# Patient Record
Sex: FEMALE | Race: WHITE | NOT HISPANIC OR LATINO | Employment: FULL TIME | URBAN - METROPOLITAN AREA
[De-identification: names, ages, dates, MRNs, and addresses within clinical notes are randomized per-mention and may not be internally consistent; named-entity substitution may affect disease eponyms.]

---

## 2018-11-20 ENCOUNTER — OFFICE VISIT (OUTPATIENT)
Dept: RETAIL CLINIC | Facility: CLINIC | Age: 38
End: 2018-11-20

## 2018-11-20 VITALS
SYSTOLIC BLOOD PRESSURE: 112 MMHG | OXYGEN SATURATION: 99 % | HEART RATE: 68 BPM | TEMPERATURE: 98 F | RESPIRATION RATE: 18 BRPM | DIASTOLIC BLOOD PRESSURE: 66 MMHG

## 2018-11-20 DIAGNOSIS — J06.9 ACUTE URI: Primary | ICD-10-CM

## 2018-11-20 DIAGNOSIS — J01.40 ACUTE NON-RECURRENT PANSINUSITIS: ICD-10-CM

## 2018-11-20 PROCEDURE — 99213 OFFICE O/P EST LOW 20 MIN: CPT | Performed by: NURSE PRACTITIONER

## 2018-11-20 RX ORDER — FLUCONAZOLE 150 MG/1
150 TABLET ORAL ONCE
Qty: 1 TABLET | Refills: 0 | Status: SHIPPED | OUTPATIENT
Start: 2018-11-20 | End: 2018-11-20

## 2018-11-20 RX ORDER — AMOXICILLIN AND CLAVULANATE POTASSIUM 875; 125 MG/1; MG/1
1 TABLET, FILM COATED ORAL 2 TIMES DAILY
Qty: 20 TABLET | Refills: 0 | Status: SHIPPED | OUTPATIENT
Start: 2018-11-20 | End: 2018-11-30

## 2018-11-20 NOTE — PROGRESS NOTES
"Ne Ward is a 38 y.o. female.     URI    This is a new problem. The current episode started in the past 7 days. The problem has been gradually worsening. There has been no fever. Associated symptoms include congestion, coughing, headaches, joint pain (\"achiness\"), a plugged ear sensation, rhinorrhea, sinus pain and a sore throat. Pertinent negatives include no nausea or vomiting. Treatments tried: allegra, dayquil, nyquil. The treatment provided no relief.        The following portions of the patient's history were reviewed and updated as appropriate: allergies, current medications, past family history, past medical history, past social history, past surgical history and problem list.    Review of Systems   Constitutional: Positive for fatigue.   HENT: Positive for congestion, rhinorrhea and sore throat.    Respiratory: Positive for cough.    Gastrointestinal: Negative for nausea and vomiting.   Genitourinary: Negative.    Musculoskeletal: Positive for joint pain (\"achiness\").   Neurological: Positive for headache.       Objective   Physical Exam   Constitutional: She is cooperative. No distress.   HENT:   Head: Normocephalic.   Right Ear: Hearing, external ear and ear canal normal. A middle ear effusion is present.   Left Ear: Hearing, external ear and ear canal normal. A middle ear effusion is present.   Nose: Right sinus exhibits maxillary sinus tenderness and frontal sinus tenderness. Left sinus exhibits maxillary sinus tenderness and frontal sinus tenderness.   Mouth/Throat: Posterior oropharyngeal erythema present.   Eyes: Conjunctivae, EOM and lids are normal. Pupils are equal, round, and reactive to light.   Neck: Trachea normal and full passive range of motion without pain.   Cardiovascular: Normal rate, regular rhythm and normal pulses.   Pulmonary/Chest: Effort normal and breath sounds normal.   Lymphadenopathy:     She has cervical adenopathy.        Right cervical: Superficial " cervical adenopathy present.        Left cervical: Superficial cervical adenopathy present.   Neurological: She is alert.   Skin: Skin is warm. Capillary refill takes less than 2 seconds.   Psychiatric: She has a normal mood and affect. Her speech is normal and behavior is normal.   Vitals reviewed.        Assessment/Plan   Vanessa was seen today for uri.    Diagnoses and all orders for this visit:    Acute URI    Acute non-recurrent pansinusitis    Other orders  -     amoxicillin-clavulanate (AUGMENTIN) 875-125 MG per tablet; Take 1 tablet by mouth 2 (Two) Times a Day for 10 days.  -     fluconazole (DIFLUCAN) 150 MG tablet; Take 1 tablet by mouth 1 (One) Time for 1 dose.

## 2020-07-21 ENCOUNTER — TRANSCRIBE ORDERS (OUTPATIENT)
Dept: PHYSICAL THERAPY | Facility: CLINIC | Age: 40
End: 2020-07-21

## 2020-07-21 DIAGNOSIS — M51.36 OTHER INTERVERTEBRAL DISC DEGENERATION, LUMBAR REGION: Primary | ICD-10-CM

## 2020-07-27 ENCOUNTER — TREATMENT (OUTPATIENT)
Dept: PHYSICAL THERAPY | Facility: CLINIC | Age: 40
End: 2020-07-27

## 2020-07-27 DIAGNOSIS — M51.36 OTHER INTERVERTEBRAL DISC DEGENERATION, LUMBAR REGION: ICD-10-CM

## 2020-07-27 DIAGNOSIS — G89.4 CHRONIC PAIN DISORDER: ICD-10-CM

## 2020-07-27 DIAGNOSIS — G89.29 CHRONIC BILATERAL LOW BACK PAIN WITHOUT SCIATICA: Primary | ICD-10-CM

## 2020-07-27 DIAGNOSIS — M54.50 CHRONIC BILATERAL LOW BACK PAIN WITHOUT SCIATICA: Primary | ICD-10-CM

## 2020-07-27 DIAGNOSIS — M79.7 FIBROMYALGIA: ICD-10-CM

## 2020-07-27 PROCEDURE — 97162 PT EVAL MOD COMPLEX 30 MIN: CPT | Performed by: PHYSICAL THERAPIST

## 2020-07-27 NOTE — PROGRESS NOTES
Physical Therapy Initial Evaluation and Plan of Care      Patient: Vanessa Ward   : 1980  Diagnosis/ICD-10 Code:  Chronic bilateral low back pain without sciatica [M54.5, G89.29]  Referring practitioner: Derrick Duque MD  Date of Initial Visit: 2020  Today's Date: 2020  Patient seen for 1 sessions           Subjective Evaluation    History of Present Illness  Onset date: 1 year.  Mechanism of injury: Patient has pain due to fibromyalgia and Sjogren's syndrome for about 1 year.  Aquatic therapy was recommended by her pain management doctor.  She has done traditional physical therapy but was not helpful with exercise and traction.  Primary areas that bother her are her pain, tailbone, hips, knees and shoulders.  Pain increases with activity and sitting is difficult, she has intense pain in her tailbone when she transitions from sit to standing that is worse the longer she sits.  She cannot get rid of stiff feeling.  She has tried walking but that has not helping. She takes gabapentin, cymbalta, plaquanil, azathioprine, restasis.       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  Current pain ratin  At best pain ratin  At worst pain rating: 10  Location: Multiple areas  Relieving factors: rest  Aggravating factors: prolonged positioning  Progression: worsening    Diagnostic Tests  MRI studies: abnormal (Slight bulge)    Treatments  Previous treatment: physical therapy, medication and chiropractic (diet)  Patient Goals  Patient goals for therapy: decreased pain             Objective          Static Posture     Comments  Upper thoracic hump starting  Mild decrease lumbar lordosis   Swelling over R dorsal wrist     Tenderness     Left Hip   Tenderness in the PSIS, greater trochanter and sacroiliac joint.     Right Hip   Tenderness in the PSIS, greater trochanter, iliac crest and sacroiliac joint.     Additional Tenderness Details  Tender to  tailbone    Active Range of Motion     Lumbar   Flexion: WFL  Extension: Active lumbar extension: 2/3. with pain  Left lateral flexion: Active left lumbar lateral flexion: 2/3 stiff.   Right lateral flexion: Active right lumbar lateral flexion: 2/3 stiff.   Left rotation: Active left lumbar rotation: 2/3 stiff.   Right rotation: Active right lumbar rotation: 2/3 stiff.     Additional Active Range of Motion Details  Flexibility:  R more than L side hamstrings, hip rotators, quadriceps with more pain on the R     Strength/Myotome Testing     Left Shoulder     Planes of Motion   Flexion: 4     Right Shoulder     Planes of Motion   Flexion: 4     Left Elbow   Flexion: 4+  Extension: 4    Right Elbow   Flexion: 4+  Extension: 4    Left Hip   Planes of Motion   Flexion: 4-    Right Hip   Planes of Motion   Flexion: 4-    Left Knee   Flexion: 4+  Extension: 4+    Right Knee   Flexion: 4+  Extension: 4+    Left Ankle/Foot   Dorsiflexion: 4+  Plantar flexion: 4    Right Ankle/Foot   Dorsiflexion: 4+  Plantar flexion: 4    Additional Strength Details  Transverse abdominals 3+/5  Able to bridge hips    Functional Assessment     Single Leg Stance   Left: 30 seconds  Right: 12 (Increased unsteadiness) seconds        See Exercise, Manual, and Modality Logs for complete treatment.   Functional outcome score: Modified Oswestry 32% disability              Assessment & Plan     Assessment  Impairments: abnormal muscle firing, abnormal muscle tone, abnormal or restricted ROM, activity intolerance, impaired balance, impaired physical strength, lacks appropriate home exercise program and pain with function  Assessment details: Vanessa Ward is a 40 y.o. year-old female referred to physical therapy for chronic back pain due to disc problem. She presents with a evolving clinical presentation of worsening symptoms.  She has comorbidities of fibromyalgia, Sjogren's syndrome, chronic fatigue and personal factors of brain fog and full-time  sitting occupation that may affect her progress in the plan of care.  Signs and symptoms are consistent with physical therapy diagnosis of impaired mobility primarily affecting sit to stand transfer due to chronic pain.   Prognosis: good  Functional Limitations: uncomfortable because of pain, standing and unable to perform repetitive tasks  Goals  Plan Goals: Date to achieve goals:  10/25/2020    Date to achieve STGs:  09/07/2020    STG 1 Patient will perform aquatic therapy exercises for lumbar  ROM/flexibility, strength, decompression and conditioning without increased pain.    STG 2 Patient will demonstrate good postural awareness with standing and walking unsupported in pool.    STG 3 Patient will report decreased peak pain following aquatic therapy session from 10/10 to 7/10 for at least 12 hours     Date to achieve LTGs:  10/25/2020    LTG 1 Patient will demonstrate an independent aquatic HEP for lumbar  strength,  ROM/flexibility, decompression, conditioning and balance with community resources     LTG 2 Patient will demonstrate increased core strength and balance to stand on single leg for 30 seconds stable     LTG 3 Patient will report decreased functional disability on Modified Oswestry score from 32 % to 22 %      Plan  Therapy options: will be seen for skilled physical therapy services  Other planned modality interventions: Aquatic therapy  Planned therapy interventions: abdominal trunk stabilization, balance/weight-bearing training, flexibility, functional ROM exercises, strengthening, neuromuscular re-education, gait training, home exercise program, therapeutic activities, stretching, transfer training and postural training  Duration in visits: 20  Duration in weeks: 12  Treatment plan discussed with: patient  Plan details: Lumbar decompression, stretching, AROM        Timed:  Manual Therapy:    0     mins  36113;  Therapeutic Exercise:    0     mins  90639;     Neuromuscular Celestino:    0    mins  94559;     Therapeutic Activity:     0     mins  29118;     Gait Trainin     mins  46259;     Ultrasound:     0     mins  06776;    Electrical Stimulation:    0     mins  81866 ( );  Iontophoresis    0     mins 07740  Dry Needling   0     mins      Untimed:  Electrical Stimulation:    0     mins  53547 ( );  Mechanical Traction:    0     mins  30634;     Timed Treatment:   0   mins   Total Treatment:     30   mins    PT SIGNATURE: Herminia Killian, PT   DATE TREATMENT INITIATED: 2020    Initial Certification  Certification Period: 10/25/2020  I certify that the therapy services are furnished while this patient is under my care.  The services outlined above are required by this patient, and will be reviewed every 90 days.     PHYSICIAN: Derrick Duque MD      DATE:     Please sign and return via fax to [phone].. Thank you, [facility].

## 2020-08-07 ENCOUNTER — TREATMENT (OUTPATIENT)
Dept: PHYSICAL THERAPY | Facility: CLINIC | Age: 40
End: 2020-08-07

## 2020-08-07 DIAGNOSIS — G89.4 CHRONIC PAIN DISORDER: ICD-10-CM

## 2020-08-07 DIAGNOSIS — M51.36 OTHER INTERVERTEBRAL DISC DEGENERATION, LUMBAR REGION: ICD-10-CM

## 2020-08-07 DIAGNOSIS — G89.29 CHRONIC BILATERAL LOW BACK PAIN WITHOUT SCIATICA: Primary | ICD-10-CM

## 2020-08-07 DIAGNOSIS — M54.50 CHRONIC BILATERAL LOW BACK PAIN WITHOUT SCIATICA: Primary | ICD-10-CM

## 2020-08-07 DIAGNOSIS — M79.7 FIBROMYALGIA: ICD-10-CM

## 2020-08-07 PROCEDURE — 97113 AQUATIC THERAPY/EXERCISES: CPT | Performed by: PHYSICAL THERAPIST

## 2020-08-07 NOTE — PROGRESS NOTES
Physical Therapy Daily Progress Note    Patient: Vanessa Ward   : 1980  Diagnosis/ICD-10 Code:  Chronic bilateral low back pain without sciatica [M54.5, G89.29]  Referring practitioner: Derrick Duque MD  Date of Initial Visit: Type: THERAPY  Noted: 2020  Today's Date: 2020  Patient seen for 2 sessions             Subjective Pt reports walking the bridge yesterday “real slow” without pain but had to go to bed early due to body fatigue “I can’t go anymore.”    Objective   AQUATICS    Water Walk 25 feet x 2 of each of the following: forward, backwards, sidestep; no UE support  Stretch  1 Standing HS stretch with large noodle at ankle 30 sec x 2 each leg   Stretch 2 Piriformis stretch seated 30 sec x 2   Stretch 3   Stretch 4  Vertical Traction   Abdominals  X 10 large noodle push downs + X 10 trunk rotations R and L with large noodle support   Clams     Hip Abd/Add X 10 each leg with single UE support  Hip Flex/Ext X 10 each leg with single UE support  March in Place   Mini Squat  X 10 with wall support  Toe/Heel Raises X 10 with railing for support  Uni-Squat    Uni-Clock    Step Ups    Bicycle  Seated x 2 min   Flutter/Scissor  Seated x 1 min each  Exercise 1 Leg press x 10 with blue ring and single UE support on rail  Exercise 2 Stand hip sweeps with large noodle under flexed knee x 10 each leg; wall at back support   Exercise 3     Assessment/Plan  Pt continues to be limited by chronic pain in both LE and UE reporting pain with exercises and stretches but able to complete. Pt educated on core activation and contraction with dynamic LE exercises for increased lumbar support/stability. Pt also told to monitor symptoms over next 48 hours following first aquatic session for appropriate exercise prescription along with importance of hydration.        Pt answered yes to headaches on COVID screen; but reports she get headaches frequently- no new symptoms.    Timed:  Aquatic Therapy    48     mins  01179;    Lauren Angel, SHANT  Physical Therapist

## 2020-08-10 ENCOUNTER — TREATMENT (OUTPATIENT)
Dept: PHYSICAL THERAPY | Facility: CLINIC | Age: 40
End: 2020-08-10

## 2020-08-10 DIAGNOSIS — G89.29 CHRONIC BILATERAL LOW BACK PAIN WITHOUT SCIATICA: Primary | ICD-10-CM

## 2020-08-10 DIAGNOSIS — M54.50 CHRONIC BILATERAL LOW BACK PAIN WITHOUT SCIATICA: Primary | ICD-10-CM

## 2020-08-10 DIAGNOSIS — G89.4 CHRONIC PAIN DISORDER: ICD-10-CM

## 2020-08-10 DIAGNOSIS — M51.36 OTHER INTERVERTEBRAL DISC DEGENERATION, LUMBAR REGION: ICD-10-CM

## 2020-08-10 DIAGNOSIS — M79.7 FIBROMYALGIA: ICD-10-CM

## 2020-08-10 PROCEDURE — 97113 AQUATIC THERAPY/EXERCISES: CPT | Performed by: PHYSICAL THERAPIST

## 2020-08-10 NOTE — PROGRESS NOTES
Physical Therapy Daily Progress Note    Patient: Vanessa Ward   : 1980  Diagnosis/ICD-10 Code:  Chronic bilateral low back pain without sciatica [M54.5, G89.29]  Referring practitioner: Derrick Duque MD  Date of Initial Visit: Type: THERAPY  Noted: 2020  Today's Date: 8/10/2020  Patient seen for 3 sessions             Subjective Pt reports soreness for a few days following last aquatic session but rates pain 3/10 this date.    Objective   AQUATICS     Water Walk     25 feet x 4 of each of the following: forward, backwards, sidestep; no UE support  Stretch  1         Standing HS stretch with large noodle at ankle 30 sec x 2 each leg   Stretch 2          Piriformis stretch seated 30 sec x 2     Stretch 3            Stretch 4  Vertical Traction            Abdominals     X 15 large noodle push downs + X 10 trunk rotations R and L with large noodle support             Clams                            Hip Abd/Add    X 12 each leg with single UE support  Hip Flex/Ext     X 12 each leg with single UE support  March in Place            x 10 each leg-alternating patter, no UE support  Mini Squat                   X 12 with wall support  Toe/Heel Raises         X 12 with railing for support  Uni-Squat                      Uni-Clock                      Step Ups                       Bicycle             Seated x 2 min              Flutter/Scissor             Seated x 1 min each  Exercise 1       Leg press x 10 with blue ring and single UE support on rail  Exercise 2       Stand hip sweeps with large noodle under flexed knee x 10 each leg; wall at back support            Exercise 3       Standing hip circles x 10 CW/CCW; rail for balance    Assessment/Plan  Slight increase in LE strengthening exercises this date; no significant changes due to reports of soreness. Addition of MIP and hip circles this chintan.Plan to continue to progress exercises to improve strength and muscle endurance along with activity  tolerance and decreased pain.        Timed:  Aquatic Therapy    45     mins 35604;    Lauren Angel, PT  Physical Therapist

## 2020-08-19 ENCOUNTER — TREATMENT (OUTPATIENT)
Dept: PHYSICAL THERAPY | Facility: CLINIC | Age: 40
End: 2020-08-19

## 2020-08-19 DIAGNOSIS — M51.36 OTHER INTERVERTEBRAL DISC DEGENERATION, LUMBAR REGION: ICD-10-CM

## 2020-08-19 DIAGNOSIS — G89.4 CHRONIC PAIN DISORDER: ICD-10-CM

## 2020-08-19 DIAGNOSIS — M54.50 CHRONIC BILATERAL LOW BACK PAIN WITHOUT SCIATICA: Primary | ICD-10-CM

## 2020-08-19 DIAGNOSIS — M79.7 FIBROMYALGIA: ICD-10-CM

## 2020-08-19 DIAGNOSIS — G89.29 CHRONIC BILATERAL LOW BACK PAIN WITHOUT SCIATICA: Primary | ICD-10-CM

## 2020-08-19 PROCEDURE — 97113 AQUATIC THERAPY/EXERCISES: CPT | Performed by: PHYSICAL THERAPIST

## 2020-08-19 NOTE — PROGRESS NOTES
Physical Therapy Daily Progress Note    Patient: Vanessa Ward   : 1980  Diagnosis/ICD-10 Code:  Chronic bilateral low back pain without sciatica [M54.5, G89.29]  Referring practitioner: Derrick Duque MD  Date of Initial Visit: Type: THERAPY  Noted: 2020  Today's Date: 2020  Patient seen for 4 sessions             Subjective Pt reports 2/10 low back pain at star of session.    Objective   AQUATICS     Water Walk     25 feet x 4 of each of the following: forward, backwards, sidestep; no UE support  Stretch  1         Standing HS stretch with large noodle at ankle 30 sec x 2 each leg   Stretch 2          Piriformis stretch seated 30 sec x 2     Stretch 3          Standing calf stretch 20 sec x 2 each leg  Stretch 4 Standing quad stretch 30 sec x 2 each leg-PT assist due to limited knee ROM  Stretch 5  Wall crawl 20 sec x 3 bouts  Vertical Traction            Abdominals     X 20 large noodle push downs + X 10 trunk rotations R and L with large noodle support             Clams                            Hip Abd/Add    X 12 each leg with single UE support  Hip Flex/Ext     X 12 each leg with single UE support  March in Place            x 10 each leg-alternating patter, no UE support  Mini Squat                   X 15 with wall support  Toe/Heel Raises         X 12 with railing for support  Uni-Squat                      Uni-Clock                      Step Ups                       Bicycle             Seated x 3 min              Flutter/Scissor             Seated x 1 min each  Exercise 1       Leg press x 15 with blue ring and single UE support on rail  Exercise 2       Stand hip sweeps with large noodle under flexed knee x 20 each leg; wall at back support            Exercise 3       Standing hip circles x 10 CW/CCW; rail for balance  Exercise 4 Seated double knees to chest x 10      Assessment/Plan  Pt able to tolerate increased reps of core/LE strengthening exercises this date (push downs, hip  sweeps, mini squats) with complaints of mild joint pain in both knees and hips intermittently. Addition of calf and quad stretch, wall crawl along with double knees to chest for flexibility and core strengthening. Planning to continue to increase reps and exercises per pt’s tolerance for decreased low back pain and improved ease with functional mobility.         Timed:  Aquatic Therapy    58     mins 35149;    Lauren Angel, PT  Physical Therapist

## 2020-08-24 ENCOUNTER — TREATMENT (OUTPATIENT)
Dept: PHYSICAL THERAPY | Facility: CLINIC | Age: 40
End: 2020-08-24

## 2020-08-24 DIAGNOSIS — G89.4 CHRONIC PAIN DISORDER: ICD-10-CM

## 2020-08-24 DIAGNOSIS — M54.50 CHRONIC BILATERAL LOW BACK PAIN WITHOUT SCIATICA: Primary | ICD-10-CM

## 2020-08-24 DIAGNOSIS — M79.7 FIBROMYALGIA: ICD-10-CM

## 2020-08-24 DIAGNOSIS — G89.29 CHRONIC BILATERAL LOW BACK PAIN WITHOUT SCIATICA: Primary | ICD-10-CM

## 2020-08-24 DIAGNOSIS — M51.36 OTHER INTERVERTEBRAL DISC DEGENERATION, LUMBAR REGION: ICD-10-CM

## 2020-08-24 PROCEDURE — 97113 AQUATIC THERAPY/EXERCISES: CPT | Performed by: PHYSICAL THERAPIST

## 2020-08-24 NOTE — PROGRESS NOTES
Physical Therapy Re-certification         Patient: Vanessa Ward   : 1980  Diagnosis/ICD-10 Code:  Chronic bilateral low back pain without sciatica [M54.5, G89.29]  Referring practitioner: Derrick Duque MD  Date of Initial Visit: Type: THERAPY  Noted: 2020  Today's Date: 2020  Patient seen for 5 sessions      Subjective:     Clinical Progress: improved  Home Program Compliance: N/A  Treatment has included:  aquatic therapy    Subjective Patient has pain due to fibromyalgia and Sjogren's syndrome for about 1 year.  Aquatic therapy was recommended by her pain management doctor.  She has done traditional physical therapy but was not helpful with exercise and traction.  Primary areas that bother her are her pain, tailbone, hips, knees and shoulders.  Pain increases with activity and sitting is difficult, she has intense pain in her tailbone when she transitions from sit to standing that is worse the longer she sits.  She cannot get rid of stiff feeling.  She has tried walking but that has not helping.    Objective   Left Shoulder      Planes of Motion   Flexion: 4+    Right Shoulder      Planes of Motion   Flexion: 4+     Left Elbow   Flexion: 4+  Extension: 4     Right Elbow   Flexion: 4+  Extension: 4     Left Hip   Planes of Motion   Flexion: 4     Right Hip   Planes of Motion   Flexion: 4-     Left Knee   Flexion: 4+  Extension: 4+     Right Knee   Flexion: 4+  Extension: 4+     Left Ankle/Foot   Dorsiflexion: 4+  Plantar flexion: 4     Right Ankle/Foot   Dorsiflexion: 4+  Plantar flexion: 4    Additional Strength Details  Transverse abdominals 3+/5  Able to bridge hips    Functional Assessment      Single Leg Stance   Left: 30 seconds  Right: average 8 sec (Increased unsteadiness with 3 trials)     netomat     Water Walk     25 feet x 4 of each of the following: forward, backwards, sidestep; no UE support  Stretch  1         Standing HS stretch with large noodle at ankle 30 sec x 2 each  leg   Stretch 2          Piriformis stretch seated 30 sec x 2     Stretch 3          Standing calf stretch 20 sec x 2 each leg  Stretch 4          Standing quad stretch 30 sec x 2 each leg-PT assist due to limited knee ROM  Stretch 5          Wall crawl 20 sec x 3 bouts-deferred due to time  Vertical Traction            Abdominals     X 20 large noodle push downs + X 10 trunk rotations R and L with large noodle support             Clams                            Hip Abd/Add    X 12 each leg with single UE support  Hip Flex/Ext     X 12 each leg with single UE support  March in Place            x 10 each leg-alternating pattern, no UE support  Mini Squat                   X 15 with wall support  Toe/Heel Raises         X 12 with railing for support  Uni-Squat                      Uni-Clock                      Step Ups                       Bicycle             Seated x 3 min-deferred due to time        Flutter/Scissor             Seated x 1 min each-deferred due to time  Exercise 1       Leg press x 15 with blue ring and single UE support on rail  Exercise 2       Stand hip sweeps with large noodle under flexed knee x 20 each leg; wall at back support            Exercise 3       Standing hip circles x 10 CW/CCW; rail for balance  Exercise 4       Seated double knees to chest x 10    Functional outcome score: 32% perceived disability on evaluation (16/50); today, 22/50 or 44% perceived disability      Assessment/Plan     Assessment  Impairments: abnormal muscle firing, abnormal muscle tone, abnormal or restricted ROM, activity intolerance, impaired balance, impaired physical strength, lacks appropriate home exercise program and pain with function  Assessment details: Vanessa Ward is a 40 y.o. year-old female referred to physical therapy for chronic back pain due to disc problem. She presents with a evolving clinical presentation of worsening symptoms. Pt reports no increase in pain since starting aquatic therapy;  unlike land therapy which made her symptoms worse. Pt reports mild soreness with aquatic therapy sessions but able to recover in 24 hours or less. PT recommend 4 more weeks of aquatic therapy to progress strength, endurance and activity tolerance to decrease pain and improve ease of functional mobility. She has comorbidities of fibromyalgia, Sjogren's syndrome, chronic fatigue and personal factors of brain fog and full-time sitting occupation that may affect her progress in the plan of care.  Signs and symptoms are consistent with physical therapy diagnosis of impaired mobility primarily affecting upright mobility due to chronic pain. Pt planning to issue HEP appropriate for pt's needs at time of discharge.   Prognosis: fair to met goals  Functional Limitations: uncomfortable because of pain, standing and unable to perform repetitive tasks, walking  Goals  Date to achieve STGs:  09/07/2020    STG 1 Patient will perform aquatic therapy exercises for lumbar  ROM/flexibility, strength, decompression and conditioning without increased pain-MET    STG 2 Patient will demonstrate good postural awareness with standing and walking unsupported in pool.-MET    STG 3 Patient will report decreased peak pain following aquatic therapy session from 10/10 to 7/10 for at least 12 hours-PROGRESSING (2-3 hours)    Date to achieve LTGs:  10/25/2020    LTG 1 Patient will demonstrate an independent aquatic HEP for lumbar strength,  ROM/flexibility, decompression, conditioning and balance with community resources-PROGRESSING    LTG 2 Patient will demonstrate increased core strength and balance to stand on single leg for 30 seconds stable-PROGRESSING    LTG 3 Patient will report decreased functional disability on Modified Oswestry score from 32% to 22%-Not MET     Plan  Therapy options: will be seen for skilled physical therapy services  Other planned modality interventions: Aquatic therapy  Planned therapy interventions: abdominal trunk  stabilization, balance/weight-bearing training, flexibility, functional ROM exercises, strengthening, neuromuscular re-education, gait training, home exercise program, therapeutic activities, stretching, transfer training and postural training  2 times per week for 4-6 more weeks  Treatment plan discussed with: patient  Plan details: Lumbar decompression, stretching, AROM      PT Signature: Lauren Angel PT      Based upon review of the patient's progress and continued therapy plan, it is my medical opinion that Vanessa Ward should continue physical therapy treatment at [facility]  [address] [place] [address]   [place] [id number] [phone]. office phone  [phone] office fax      Re-certification  Certification Period: 11/22/2020  I certify that the therapy services are furnished while this patient is under my care.  The services outlined above are required by this patient, and will be reviewed every 90 days.     PHYSICIAN: Derrick Duque MD      DATE:     Signature: __________________________________  Derrick Duque MD    Please sign and return via fax to [phone]   Thank you, [facility].    Timed:  Aquatic Therapy    55     mins 90309;

## 2020-08-26 ENCOUNTER — TREATMENT (OUTPATIENT)
Dept: PHYSICAL THERAPY | Facility: CLINIC | Age: 40
End: 2020-08-26

## 2020-08-26 DIAGNOSIS — M54.50 CHRONIC BILATERAL LOW BACK PAIN WITHOUT SCIATICA: Primary | ICD-10-CM

## 2020-08-26 DIAGNOSIS — G89.29 CHRONIC BILATERAL LOW BACK PAIN WITHOUT SCIATICA: Primary | ICD-10-CM

## 2020-08-26 DIAGNOSIS — G89.4 CHRONIC PAIN DISORDER: ICD-10-CM

## 2020-08-26 DIAGNOSIS — M79.7 FIBROMYALGIA: ICD-10-CM

## 2020-08-26 DIAGNOSIS — M51.36 OTHER INTERVERTEBRAL DISC DEGENERATION, LUMBAR REGION: ICD-10-CM

## 2020-08-26 PROCEDURE — 97113 AQUATIC THERAPY/EXERCISES: CPT | Performed by: PHYSICAL THERAPIST

## 2020-08-26 NOTE — PROGRESS NOTES
Physical Therapy Daily Progress Note    Patient: Vanessa Ward   : 1980  Diagnosis/ICD-10 Code:  Chronic bilateral low back pain without sciatica [M54.5, G89.29]  Referring practitioner: Derrick Duque MD  Date of Initial Visit: Type: THERAPY  Noted: 2020  Today's Date: 2020  Patient seen for 6 sessions             Subjective Pt reports increased pain this date with possible “flare up” due to auto-immune disease; rating pain 5/10 at start of session.    Objective   AQUATICS     Water Walk     25 feet x 4 of each of the following: forward, backwards, sidestep; no UE support  Stretch  1         Standing HS stretch with large noodle at ankle 30 sec x 2 each leg   Stretch 2          Piriformis stretch seated 30 sec x 2     Stretch 3          Standing calf stretch 20 sec x 2 each leg  Stretch 4          Standing quad stretch 30 sec x 2 each leg-PT assist due to limited knee ROM  Stretch 5          Wall crawl 20 sec x 3 bouts-deferred due to time  Vertical Traction            Abdominals     X 20 large noodle push downs + X 10 trunk rotations R and L with large noodle support             Clams                            Hip Abd/Add    X 12 each leg with single UE support  Hip Flex/Ext     X 12 each leg with single UE support  March in Place            x 10 each leg-alternating pattern, no UE support  Mini Squat                   X 15 with wall support  Toe/Heel Raises         X 15 with railing for support   Uni-Squat                      Uni-Clock                      Step Ups                       Bicycle             Seated x 3 min       Flutter/Scissor             Seated x 1 min each  Exercise 1       Leg press x 15 with blue ring and single UE support on rail  Exercise 2       Stand hip sweeps with large noodle under flexed knee x 20 each leg; wall at back support            Exercise 3       Standing hip circles x 10 CW/CCW; rail for balance  Exercise 4       Seated double knees to chest x  10      Assessment/Plan   Pt tends to favor L side due to increased pain and weakness on R side this date reported in shoulders, back, tailbone/hips. No increase in reps this date due to soreness and pain. Planning to continue to progress strength and endurance as pt tolerates; pt educated to continue to monitor symptoms with no real change in pain during this session. Addition of seated knees to chest for lumbar ROM and core strengthening with no complaints of increased pain. Pt issued land and aquatic HEP for home use this date. PT planning to complete 2 more session and if no improvement, referring back to MD.         Timed:  Aquatic Therapy    54     mins [id number];    Lauren Angel, PT  Physical Therapist

## 2020-08-31 ENCOUNTER — TREATMENT (OUTPATIENT)
Dept: PHYSICAL THERAPY | Facility: CLINIC | Age: 40
End: 2020-08-31

## 2020-08-31 DIAGNOSIS — M54.50 CHRONIC BILATERAL LOW BACK PAIN WITHOUT SCIATICA: Primary | ICD-10-CM

## 2020-08-31 DIAGNOSIS — M79.7 FIBROMYALGIA: ICD-10-CM

## 2020-08-31 DIAGNOSIS — M51.36 OTHER INTERVERTEBRAL DISC DEGENERATION, LUMBAR REGION: ICD-10-CM

## 2020-08-31 DIAGNOSIS — G89.4 CHRONIC PAIN DISORDER: ICD-10-CM

## 2020-08-31 DIAGNOSIS — G89.29 CHRONIC BILATERAL LOW BACK PAIN WITHOUT SCIATICA: Primary | ICD-10-CM

## 2020-08-31 PROCEDURE — 97113 AQUATIC THERAPY/EXERCISES: CPT | Performed by: PHYSICAL THERAPIST

## 2020-08-31 NOTE — PROGRESS NOTES
Physical Therapy Daily Progress Note    Patient: Vanessa Ward   : 1980  Diagnosis/ICD-10 Code:  Chronic bilateral low back pain without sciatica [M54.5, G89.29]  Referring practitioner: Derrick Duque MD  Date of Initial Visit: Type: THERAPY  Noted: 2020  Today's Date: 2020  Patient seen for 7 sessions             Subjective Pt reports no sig change since last session rating pain 4/10 in back, hips, tailbone and between shoulders    Objective     AQUATICS     Water Walk     25 feet x 4 of each of the following: forward, backwards, sidestep; no UE support  Stretch  1         Standing HS stretch with foot on pool bench 30 sec x 2 each leg   Stretch 2          Piriformis stretch seated 30 sec x 2     Stretch 3          Standing calf stretch 30 sec x 2 each leg  Stretch 4          Standing quad stretch 30 sec x 2 each leg-PT assist due to limited knee ROM  Stretch 5          Wall crawl 20 sec x 3 bouts-deferred due to time  Vertical Traction            Abdominals     X 20 large noodle push downs + X 10 trunk rotations R and L with large noodle support + shoulder ABD/ADD x 10 + seated alternating rows x 10 + seated scapular retraction x 10  (all UE exercises with yellow medium dumbells)          Clams                            Hip Abd/Add    X 15 each leg with single UE support  Hip Flex/Ext     X 15 each leg with single UE support  March in Place            x 10 each leg-alternating pattern, no UE support  Mini Squat                   X 15 with wall support  Toe/Heel Raises         X 15 with railing for support   Uni-Squat                      Uni-Clock                      Step Ups                       Bicycle             Seated x 3 min-deferred due to time   Flutter/Scissor             Seated x 1 min each-deferred due to time  Exercise 1       Leg press x 20 with blue ring and single UE support on rail  Exercise 2       Stand hip sweeps with large noodle under flexed knee x 20 each leg; wall  "at back support            Exercise 3       Standing hip circles x 10 CW/CCW; rail for balance  Exercise 4       Seated double knees to chest x 10-deferred due to time  Exercise 5  Tandem gait x 25 feet with large noodle x 2 for support    Assessment/Plan  Pt able to tolerate increased reps of LE exercises including SLR in all directions and leg press; addition of UE activity to address core stabilization and scapular pain and tandem gait to improve balance. Pt reports various sensation such as \"burning, smouldering, over-worked, asleep\" to describe her pain at rest and with exercises; no consistent movement. Planning to continue to address strength of LE and core along with shoulder mobility and balance for improved ease of functional mobility and ADLs.        Timed:  Aquatic Therapy    55     mins [id number];    Lauren Angel, PT  Physical Therapist  "

## 2020-09-02 ENCOUNTER — TREATMENT (OUTPATIENT)
Dept: PHYSICAL THERAPY | Facility: CLINIC | Age: 40
End: 2020-09-02

## 2020-09-02 DIAGNOSIS — G89.4 CHRONIC PAIN DISORDER: ICD-10-CM

## 2020-09-02 DIAGNOSIS — M51.36 OTHER INTERVERTEBRAL DISC DEGENERATION, LUMBAR REGION: ICD-10-CM

## 2020-09-02 DIAGNOSIS — M54.50 CHRONIC BILATERAL LOW BACK PAIN WITHOUT SCIATICA: Primary | ICD-10-CM

## 2020-09-02 DIAGNOSIS — M79.7 FIBROMYALGIA: ICD-10-CM

## 2020-09-02 DIAGNOSIS — G89.29 CHRONIC BILATERAL LOW BACK PAIN WITHOUT SCIATICA: Primary | ICD-10-CM

## 2020-09-02 PROCEDURE — 97113 AQUATIC THERAPY/EXERCISES: CPT | Performed by: PHYSICAL THERAPIST

## 2020-09-02 NOTE — PROGRESS NOTES
Physical Therapy Daily Progress Note    Patient: Vanessa Ward   : 1980  Diagnosis/ICD-10 Code:  Chronic bilateral low back pain without sciatica [M54.5, G89.29]  Referring practitioner: Derrick Duque MD  Date of Initial Visit: Type: THERAPY  Noted: 2020  Today's Date: 2020  Patient seen for 8 sessions             Subjective Pt reports “it’s not too bad today, other than my knees which always bother me.” Pt rates knee pain 4/10.    Objective     AQUATICS     Water Walk     25 feet x 4 of each of the following: forward, backwards, sidestep; no UE support  Stretch  1         Standing HS stretch with foot on pool bench 30 sec x 2 each leg   Stretch 2          Piriformis stretch seated 30 sec x 2     Stretch 3          Standing calf stretch 30 sec x 2 each leg  Stretch 4          Standing quad stretch 30 sec x 2 each leg-PT assist due to limited knee ROM  Stretch 5          Wall crawl 20 sec x 3 bouts-deferred due to time  Vertical Traction            Abdominals     Large noodle push downs x 20 + trunk rotations R and L with large noodle support x 10 each side + shoulder ABD/ADD x 15 + seated alternating rows x 15 + seated scapular retraction x 15 (all UE exercises with yellow medium dumbells)          Clams                            Hip Abd/Add    X 15 each leg with single UE support  Hip Flex/Ext     X 15 each leg with single UE support  March in Place            X 15 each leg-alternating pattern, no UE support  Mini Squat                   X 15 with wall support  Toe/Heel Raises         X 15 with railing for support   Uni-Squat                      Uni-Clock                      Step Ups                       Bicycle             Seated x 3 min-deferred due to time   Flutter/Scissor             Seated x 1 min each-deferred due to time  Exercise 1       Leg press x 20 with blue ring and single UE support on rail  Exercise 2       Stand hip sweeps with large noodle under flexed knee x 20 each  "leg; wall at back support            Exercise 3       Standing hip circles x 10 CW/CCW; rail for balance  Exercise 4       Seated double knees to chest x 15  Exercise 5       Tandem gait x 25 feet with large noodle for support      Assessment/Plan  Pt continues to make progress with aquatic exercises able to tolerate increase reps of core/UE strengthening and noted decreased overall pain this date. Pt reports she is limited by chronic joint pain that \"changes daily, sometimes hourly\". PT planning to issue finalized HEP next session and reduce pt to 1 time per week to see how pt maintains strength, endurance and flexibility.       Timed:  Aquatic Therapy    57     mins [id number];    Lauren Angel, PT  Physical Therapist  "

## 2020-09-11 ENCOUNTER — TREATMENT (OUTPATIENT)
Dept: PHYSICAL THERAPY | Facility: CLINIC | Age: 40
End: 2020-09-11

## 2020-09-11 DIAGNOSIS — M51.36 OTHER INTERVERTEBRAL DISC DEGENERATION, LUMBAR REGION: ICD-10-CM

## 2020-09-11 DIAGNOSIS — M79.7 FIBROMYALGIA: ICD-10-CM

## 2020-09-11 DIAGNOSIS — G89.4 CHRONIC PAIN DISORDER: ICD-10-CM

## 2020-09-11 DIAGNOSIS — M54.50 CHRONIC BILATERAL LOW BACK PAIN WITHOUT SCIATICA: Primary | ICD-10-CM

## 2020-09-11 DIAGNOSIS — G89.29 CHRONIC BILATERAL LOW BACK PAIN WITHOUT SCIATICA: Primary | ICD-10-CM

## 2020-09-11 PROCEDURE — 97113 AQUATIC THERAPY/EXERCISES: CPT | Performed by: PHYSICAL THERAPIST

## 2020-09-11 NOTE — PROGRESS NOTES
Physical Therapy Daily Progress Note    Patient: Vanessa Ward   : 1980  Diagnosis/ICD-10 Code:  Chronic bilateral low back pain without sciatica [M54.5, G89.29]  Referring practitioner: Derrick Duque MD  Date of Initial Visit: Type: THERAPY  Noted: 2020  Today's Date: 2020  Patient seen for 9 sessions             Subjective Evaluation    History of Present Illness    Subjective comment: Had my rheumatologist appt. this morning and pain has been gradually going up since.Pain  Current pain rating: 3           Objective     AQUATICS     Water Walk     25 feet x 4 of each of the following: forward, backwards, sidestep; no UE support  Stretch  1         Standing HS stretch with foot on pool bench 30 sec x 2 each leg   Stretch 2          Piriformis stretch seated 30 sec x 2     Stretch 3          Standing calf stretch 30 sec x 2 each leg  Stretch 4          Standing quad stretch 30 sec x 2 each leg-PT assist due to limited knee ROM  Stretch 5          Wall crawl 20 sec x 3 bouts-deferred due to time  Vertical Traction            Abdominals     Large noodle push downs x 20 + trunk rotations R and L with large noodle support x 10 each side + shoulder ABD/ADD x 15 + seated alternating rows x 15 + seated scapular retraction x 15 (all UE exercises with yellow medium dumbells)          Clams               --          Hip Abd/Add    X 15 each leg with single UE support  Hip Flex/Ext     X 15 each leg with single UE support  March in Place            X 15 each leg-alternating pattern, no UE support  Mini Squat                   X 15 with wall support  Toe/Heel Raises         X 15 with railing for support   Uni-Squat          --          Uni-Clock          --           Step Ups         --                 Bicycle             Seated x 3 min-deferred   Flutter/Scissor             Seated x 1 min each - deferred  Exercise 1       Leg press x 20 with blue ring and single UE support on rail  Exercise  2       Stand hip sweeps with large noodle under flexed knee x 20 each leg; wall at back support            Exercise 3       Standing hip circles x 10 CW/CCW; rail for balance  Exercise 4       Seated double knees to chest x 15  Exercise 5       Tandem gait x 75 feet with large noodle for support      Assessment & Plan     Assessment  Assessment details: Patient reports little overall improvement in her actual pain but can tell a difference in stamina and activity tolerance since starting therapy.  Continued with most previous aquatic ex/activity with minimal cuing.  She demonstrates improving ex recall and increasing independence with aquatic ex/activity.  Balance challenged with tandem walking.      Plan:  Continue working on general strengthening, core stabilization, and flexibility to improve functional activity tolerance.  Decrease frequency of therapy to once weekly.        Progress per Plan of Care and Progress strengthening /stabilization /functional activity            Timed:  Aquatic Therapy    47     mins [id number];    Blanca Tran, PT  Physical Therapist

## 2020-09-14 ENCOUNTER — TREATMENT (OUTPATIENT)
Dept: PHYSICAL THERAPY | Facility: CLINIC | Age: 40
End: 2020-09-14

## 2020-09-14 DIAGNOSIS — M54.50 CHRONIC BILATERAL LOW BACK PAIN WITHOUT SCIATICA: Primary | ICD-10-CM

## 2020-09-14 DIAGNOSIS — G89.29 CHRONIC BILATERAL LOW BACK PAIN WITHOUT SCIATICA: Primary | ICD-10-CM

## 2020-09-14 DIAGNOSIS — M51.36 OTHER INTERVERTEBRAL DISC DEGENERATION, LUMBAR REGION: ICD-10-CM

## 2020-09-14 DIAGNOSIS — G89.4 CHRONIC PAIN DISORDER: ICD-10-CM

## 2020-09-14 DIAGNOSIS — M79.7 FIBROMYALGIA: ICD-10-CM

## 2020-09-14 PROCEDURE — 97113 AQUATIC THERAPY/EXERCISES: CPT | Performed by: PHYSICAL THERAPIST

## 2020-09-14 NOTE — PROGRESS NOTES
"Physical Therapy Daily Progress Note    Patient: Vanessa Ward   : 1980  Diagnosis/ICD-10 Code:  Chronic bilateral low back pain without sciatica [M54.5, G89.29]  Referring practitioner: Derrick Duque MD  Date of Initial Visit: Type: THERAPY  Noted: 2020  Today's Date: 2020  Patient seen for 10 sessions             Subjective Pain today \"is not too bad\" but notes improving endurance and activity tolerance with therapy.    Objective   AQUATICS     Water Walk     25 feet x 4 of each of the following: forward, backwards, sidestep; no UE support  Stretch  1         Standing HS stretch with foot on pool bench 30 sec x 2 each leg   Stretch 2          Piriformis stretch seated 30 sec x 2     Stretch 3          Standing calf stretch 30 sec x 2 each leg  Stretch 4          Standing quad stretch 30 sec x 2 each leg-PT assist due to limited knee ROM  Stretch 5          Wall crawl 20 sec x 3 bouts-deferred due to time  Vertical Traction            Abdominals     Large noodle push downs x 20 + trunk rotations R and L with large noodle support x 10 each side + shoulder ABD/ADD x 15 + seated alternating rows x 15 + seated scapular retraction x 15 (all UE exercises with yellow medium dumbells)          Clams               --          Hip Abd/Add    X 15 each leg with single UE support  Hip Flex/Ext     X 15 each leg with single UE support  March in Place            X 15 each leg-alternating pattern, no UE support  Mini Squat                   X 15 with wall support  Toe/Heel Raises         X 15 with railing for support   Uni-Squat          --          Uni-Clock          --           Step Ups           --                 Bicycle             Seated x 3 min-deferred   Flutter/Scissor             Seated x 1 min each - deferred  Exercise 1       Leg press x 20 with blue ring and single UE support on rail  Exercise 2       Stand hip sweeps with large noodle under flexed knee x 20 each leg; wall at back " support            Exercise 3       Standing hip circles x 10 CW/CCW; rail for balance  Exercise 4       Seated double knees to chest x 15  Exercise 5       Tandem gait x 75 feet with large noodle for support      Assessment/Plan  Pt reports follow up with RA/pain management doctor last week and she is recommending pt see psychology; pt reports she is open to the suggestion and encouraged to try a few sessions. Pt reports land HEP is going ok and ready to discharge next session. Pt encouraged to try walking program 2-3 days per week once discharged from therapy to maintain endurance. Plan to discharge next session.         Timed:  Aquatic Therapy    45     mins [id number];    Lauren Angel, PT  Physical Therapist

## 2020-09-21 ENCOUNTER — TREATMENT (OUTPATIENT)
Dept: PHYSICAL THERAPY | Facility: CLINIC | Age: 40
End: 2020-09-21

## 2020-09-21 DIAGNOSIS — G89.29 CHRONIC BILATERAL LOW BACK PAIN WITHOUT SCIATICA: Primary | ICD-10-CM

## 2020-09-21 DIAGNOSIS — M54.50 CHRONIC BILATERAL LOW BACK PAIN WITHOUT SCIATICA: Primary | ICD-10-CM

## 2020-09-21 DIAGNOSIS — M51.36 OTHER INTERVERTEBRAL DISC DEGENERATION, LUMBAR REGION: ICD-10-CM

## 2020-09-21 DIAGNOSIS — M79.7 FIBROMYALGIA: ICD-10-CM

## 2020-09-21 DIAGNOSIS — G89.4 CHRONIC PAIN DISORDER: ICD-10-CM

## 2020-09-21 PROCEDURE — 97113 AQUATIC THERAPY/EXERCISES: CPT | Performed by: PHYSICAL THERAPIST

## 2020-09-21 NOTE — PROGRESS NOTES
Physical Therapy Daily Progress Note + DISCHARGE     Patient: Vanessa Ward   : 1980  Diagnosis/ICD-10 Code:  Chronic bilateral low back pain without sciatica [M54.5, G89.29]  Referring practitioner: Derrick Duque MD  Date of Initial Visit: Type: THERAPY  Noted: 2020  Today's Date: 2020  Patient seen for 11 sessions           Subjective Pt reports back pain 2/10 this date.  Clinical Progress: improved  Home Program Compliance: N/A  Treatment has included:  aquatic therapy     Subjective Patient has pain due to fibromyalgia and Sjogren's syndrome for about 1 year.  Aquatic therapy was recommended by her pain management doctor.  She has done traditional physical therapy but was not helpful with exercise and traction.  Primary areas that bother her are her pain, tailbone, hips, knees and shoulders.  Pain increases with activity and sitting is difficult, she has intense pain in her tailbone when she transitions from sit to standing that is worse the longer she sits.  She cannot get rid of stiff feeling.  She has tried walking but that has not helping.    Objective   Left Shoulder      Planes of Motion   Flexion: 4+  ABD: 4    Right Shoulder      Planes of Motion   Flexion: 4+  ABD :4     Left Elbow   Flexion: 5  Extension: 4+     Right Elbow   Flexion: 5  Extension: 4+     Left Hip   Planes of Motion   Flexion: 4  ABD:4   ADD: 4-    Right Hip   Planes of Motion   Flexion: 4  ABD: 4  ADD: 4-     Left Knee   Flexion: 4+  Extension: 5     Right Knee   Flexion: 4+  Extension: 5     Left Ankle/Foot   Dorsiflexion: 5  Plantar flexion: 4     Right Ankle/Foot   Dorsiflexion: 5  Plantar flexion: 4     Functional Assessment      Single Leg Stance   Left: 30 seconds  Right: average 11 sec (Increased unsteadiness with 3 trials)     ICVRx     Water Walk     25 feet x 4 of each of the following: forward, backwards, sidestep; no UE support  Stretch  1         Standing HS stretch with foot on pool bench 30  sec x 2 each leg   Stretch 2          Piriformis stretch seated 30 sec x 2     Stretch 3          Standing calf stretch 30 sec x 2 each leg-deferred due to time  Stretch 4          Standing quad stretch 30 sec x 2 each leg-PT assist due to limited knee ROM  Stretch 5          Wall crawl 20 sec x 3 bouts-deferred due to time  Vertical Traction            Abdominals     Large noodle push downs x 20 + trunk rotations R and L with large noodle support x 10 each side + shoulder ABD/ADD x 15 + seated alternating rows x 15 + seated scapular retraction x 15 (all UE exercises with yellow medium dumbells)          Clams               --          Hip Abd/Add    X 20 each leg with single UE support  Hip Flex/Ext     X 20 each leg with single UE support  March in Place            X 15 each leg-alternating pattern, no UE support  Mini Squat                   X 20 with wall support  Toe/Heel Raises         X 20 with railing for support   Uni-Squat          --          Uni-Clock          --           Step Ups           --                 Bicycle             Seated x 3 min-deferred   Flutter/Scissor             Seated x 1 min each - deferred  Exercise 1       Leg press x 20 with blue ring and single UE support on rail  Exercise 2       Stand hip sweeps with large noodle under flexed knee x 20 each leg; wall at back support            Exercise 3       Standing hip circles x 10 CW/CCW; rail for balance  Exercise 4       Seated double knees to chest x 15  Exercise 5       Tandem gait x 75 feet with large noodle for support      Assessment/Plan  Assessment  Impairments: abnormal muscle firing, abnormal muscle tone, abnormal or restricted ROM, activity intolerance, impaired balance, impaired physical strength, lacks appropriate home exercise program and pain with function  Assessment details: Vanessa Ward is a 40 year-old female referred to physical therapy for chronic back pain due to disc problem. She presents with a evolving  clinical presentation and minimal change in symptoms with aquatic therapy. Pt reports no increase in pain since starting aquatic therapy; unlike land therapy which made her symptoms worse. Pt reports mild soreness with aquatic therapy sessions but able to recover in 24 hours or less. PT recommending discharge at this time due to pt reaching maximal potential and able to complete aquatic HEP independently. She has comorbidities of fibromyalgia, Sjogren's syndrome, chronic fatigue and personal factors of brain fog and full-time sitting occupation that affect her progress in the plan of care.  Signs and symptoms are consistent with physical therapy diagnosis of impaired mobility primarily affecting upright mobility due to chronic pain. Pt has both aquatic and land HEP to continue on her own. Pt reports pain management is recommending referral to psychology for further pain control.  Prognosis: fair to met goals  Functional Limitations: uncomfortable because of pain, standing and unable to perform repetitive tasks, walking  Goals  Date to achieve STGs:  09/07/2020    STG 1 Patient will perform aquatic therapy exercises for lumbar  ROM/flexibility, strength, decompression and conditioning without increased pain-MET    STG 2 Patient will demonstrate good postural awareness with standing and walking unsupported in pool.-MET    STG 3 Patient will report decreased peak pain following aquatic therapy session from 10/10 to 7/10 for at least 12 hours-NOT MET (4-6 hours)    Date to achieve LTGs:  10/25/2020    LTG 1 Patient will demonstrate an independent aquatic HEP for lumbar strength,  ROM/flexibility, decompression, conditioning and balance with community resources-MET    LTG 2 Patient will demonstrate increased core strength and balance to stand on single leg for 30 seconds stable-PROGRESSING (11 sec)    LTG 3 Patient will report decreased functional disability on Modified Oswestry score from 32% to 22%-Not MET  (44%)    Number of visits: 11 (including evaluation)  Dates of visits: 7/27/2020-9/21/2020    Discharge to [facility]       Timed:  Aquatic Therapy    46     mins [id number];    Lauren Angel, PT  Physical Therapist

## 2021-01-11 ENCOUNTER — DOCUMENTATION (OUTPATIENT)
Dept: PHYSICAL THERAPY | Facility: CLINIC | Age: 41
End: 2021-01-11

## 2021-01-11 DIAGNOSIS — M51.36 OTHER INTERVERTEBRAL DISC DEGENERATION, LUMBAR REGION: ICD-10-CM

## 2021-01-11 DIAGNOSIS — G89.4 CHRONIC PAIN DISORDER: ICD-10-CM

## 2021-01-11 DIAGNOSIS — M79.7 FIBROMYALGIA: ICD-10-CM

## 2021-01-11 DIAGNOSIS — M54.50 CHRONIC BILATERAL LOW BACK PAIN WITHOUT SCIATICA: Primary | ICD-10-CM

## 2021-01-11 DIAGNOSIS — G89.29 CHRONIC BILATERAL LOW BACK PAIN WITHOUT SCIATICA: Primary | ICD-10-CM

## 2021-01-11 NOTE — PROGRESS NOTES
Discharge Summary  Discharge Summary from Physical Therapy Report    Patient Information  Vanessa Ward  1980    Dates  PT visit: 7/27/2020-9/21/2020  Number of Visits: 11    Discharge Status of Patient: See discharge note by PT on 9/21/2020    Goals: Partially Met    Visit Diagnoses:    ICD-10-CM ICD-9-CM   1. Chronic bilateral low back pain without sciatica  M54.5 724.2    G89.29 338.29   2. Fibromyalgia  M79.7 729.1   3. Chronic pain disorder  G89.4 338.4   4. Other intervertebral disc degeneration, lumbar region  M51.36 722.52       Discharge Plan: Continue with current home exercise program as instructed    Comments Vanessa Ward is a 40 year-old female referred to physical therapy for chronic back pain due to disc problem. She presents with a evolving clinical presentation and minimal change in symptoms with aquatic therapy. Pt reports no increase in pain since starting aquatic therapy; unlike land therapy which made her symptoms worse. Pt reports mild soreness with aquatic therapy sessions but able to recover in 24 hours or less. PT recommending discharge at this time due to pt reaching maximal potential and able to complete aquatic HEP independently. She has comorbidities of fibromyalgia, Sjogren's syndrome, chronic fatigue and personal factors of brain fog and full-time sitting occupation that affect her progress in the plan of care.  Signs and symptoms are consistent with physical therapy diagnosis of impaired mobility primarily affecting upright mobility due to chronic pain. Pt has both aquatic and land HEP to continue on her own. Pt reports pain management is recommending referral to psychology for further pain control.      Lauren Angel, PT  Physical Therapist

## 2021-04-01 ENCOUNTER — APPOINTMENT (OUTPATIENT)
Dept: WOMENS IMAGING | Facility: HOSPITAL | Age: 41
End: 2021-04-01

## 2021-04-01 PROCEDURE — 77067 SCR MAMMO BI INCL CAD: CPT | Performed by: RADIOLOGY

## 2021-04-01 PROCEDURE — 77063 BREAST TOMOSYNTHESIS BI: CPT | Performed by: RADIOLOGY

## 2021-04-06 ENCOUNTER — BULK ORDERING (OUTPATIENT)
Dept: CASE MANAGEMENT | Facility: OTHER | Age: 41
End: 2021-04-06

## 2021-04-06 DIAGNOSIS — Z23 IMMUNIZATION DUE: ICD-10-CM

## 2021-04-23 ENCOUNTER — APPOINTMENT (OUTPATIENT)
Dept: WOMENS IMAGING | Facility: HOSPITAL | Age: 41
End: 2021-04-23

## 2021-04-23 PROCEDURE — 77065 DX MAMMO INCL CAD UNI: CPT | Performed by: RADIOLOGY

## 2021-04-23 PROCEDURE — 77061 BREAST TOMOSYNTHESIS UNI: CPT | Performed by: RADIOLOGY

## 2021-04-23 PROCEDURE — G0279 TOMOSYNTHESIS, MAMMO: HCPCS | Performed by: RADIOLOGY

## 2021-05-04 ENCOUNTER — APPOINTMENT (OUTPATIENT)
Dept: WOMENS IMAGING | Facility: HOSPITAL | Age: 41
End: 2021-05-04

## 2021-05-04 PROCEDURE — 19081 BX BREAST 1ST LESION STRTCTC: CPT | Performed by: RADIOLOGY

## 2021-05-14 ENCOUNTER — OFFICE VISIT (OUTPATIENT)
Dept: SURGERY | Facility: CLINIC | Age: 41
End: 2021-05-14

## 2021-05-14 ENCOUNTER — PREP FOR SURGERY (OUTPATIENT)
Dept: OTHER | Facility: HOSPITAL | Age: 41
End: 2021-05-14

## 2021-05-14 VITALS — WEIGHT: 213 LBS | BODY MASS INDEX: 32.28 KG/M2 | HEIGHT: 68 IN

## 2021-05-14 DIAGNOSIS — N63.0 BREAST MASS: Primary | ICD-10-CM

## 2021-05-14 DIAGNOSIS — N64.9 BREAST LESION: Primary | ICD-10-CM

## 2021-05-14 PROCEDURE — 99204 OFFICE O/P NEW MOD 45 MIN: CPT | Performed by: STUDENT IN AN ORGANIZED HEALTH CARE EDUCATION/TRAINING PROGRAM

## 2021-05-14 RX ORDER — CEFAZOLIN SODIUM 2 G/100ML
2 INJECTION, SOLUTION INTRAVENOUS ONCE
Status: CANCELLED | OUTPATIENT
Start: 2021-05-20 | End: 2021-05-14

## 2021-05-14 RX ORDER — TRAZODONE HYDROCHLORIDE 50 MG/1
50 TABLET ORAL NIGHTLY
COMMUNITY
Start: 2021-02-22

## 2021-05-14 RX ORDER — DULOXETIN HYDROCHLORIDE 60 MG/1
60 CAPSULE, DELAYED RELEASE ORAL EVERY MORNING
COMMUNITY
Start: 2019-11-18 | End: 2021-11-23 | Stop reason: ALTCHOICE

## 2021-05-14 RX ORDER — HYDROXYCHLOROQUINE SULFATE 200 MG/1
400 TABLET, FILM COATED ORAL DAILY
COMMUNITY
Start: 2019-09-18

## 2021-05-18 ENCOUNTER — PRE-ADMISSION TESTING (OUTPATIENT)
Dept: PREADMISSION TESTING | Facility: HOSPITAL | Age: 41
End: 2021-05-18

## 2021-05-18 VITALS
TEMPERATURE: 97.5 F | RESPIRATION RATE: 16 BRPM | BODY MASS INDEX: 32.1 KG/M2 | SYSTOLIC BLOOD PRESSURE: 93 MMHG | HEART RATE: 94 BPM | HEIGHT: 68 IN | OXYGEN SATURATION: 100 % | WEIGHT: 211.8 LBS | DIASTOLIC BLOOD PRESSURE: 61 MMHG

## 2021-05-18 DIAGNOSIS — N64.9 BREAST LESION: ICD-10-CM

## 2021-05-18 LAB
ANION GAP SERPL CALCULATED.3IONS-SCNC: 6.2 MMOL/L (ref 5–15)
BASOPHILS # BLD AUTO: 0.03 10*3/MM3 (ref 0–0.2)
BASOPHILS NFR BLD AUTO: 0.8 % (ref 0–1.5)
BUN SERPL-MCNC: 9 MG/DL (ref 6–20)
BUN/CREAT SERPL: 12.7 (ref 7–25)
CALCIUM SPEC-SCNC: 8.7 MG/DL (ref 8.6–10.5)
CHLORIDE SERPL-SCNC: 108 MMOL/L (ref 98–107)
CO2 SERPL-SCNC: 26.8 MMOL/L (ref 22–29)
CREAT SERPL-MCNC: 0.71 MG/DL (ref 0.57–1)
DEPRECATED RDW RBC AUTO: 38.4 FL (ref 37–54)
EOSINOPHIL # BLD AUTO: 0.06 10*3/MM3 (ref 0–0.4)
EOSINOPHIL NFR BLD AUTO: 1.6 % (ref 0.3–6.2)
ERYTHROCYTE [DISTWIDTH] IN BLOOD BY AUTOMATED COUNT: 11 % (ref 12.3–15.4)
GFR SERPL CREATININE-BSD FRML MDRD: 91 ML/MIN/1.73
GLUCOSE SERPL-MCNC: 86 MG/DL (ref 65–99)
HCT VFR BLD AUTO: 41.2 % (ref 34–46.6)
HGB BLD-MCNC: 13.7 G/DL (ref 12–15.9)
IMM GRANULOCYTES # BLD AUTO: 0.01 10*3/MM3 (ref 0–0.05)
IMM GRANULOCYTES NFR BLD AUTO: 0.3 % (ref 0–0.5)
LYMPHOCYTES # BLD AUTO: 1.02 10*3/MM3 (ref 0.7–3.1)
LYMPHOCYTES NFR BLD AUTO: 26.4 % (ref 19.6–45.3)
MCH RBC QN AUTO: 31.8 PG (ref 26.6–33)
MCHC RBC AUTO-ENTMCNC: 33.3 G/DL (ref 31.5–35.7)
MCV RBC AUTO: 95.6 FL (ref 79–97)
MONOCYTES # BLD AUTO: 0.32 10*3/MM3 (ref 0.1–0.9)
MONOCYTES NFR BLD AUTO: 8.3 % (ref 5–12)
NEUTROPHILS NFR BLD AUTO: 2.43 10*3/MM3 (ref 1.7–7)
NEUTROPHILS NFR BLD AUTO: 62.6 % (ref 42.7–76)
NRBC BLD AUTO-RTO: 0 /100 WBC (ref 0–0.2)
PLATELET # BLD AUTO: 177 10*3/MM3 (ref 140–450)
PMV BLD AUTO: 11.3 FL (ref 6–12)
POTASSIUM SERPL-SCNC: 4 MMOL/L (ref 3.5–5.2)
QT INTERVAL: 409 MS
RBC # BLD AUTO: 4.31 10*6/MM3 (ref 3.77–5.28)
SARS-COV-2 ORF1AB RESP QL NAA+PROBE: NOT DETECTED
SODIUM SERPL-SCNC: 141 MMOL/L (ref 136–145)
WBC # BLD AUTO: 3.87 10*3/MM3 (ref 3.4–10.8)

## 2021-05-18 PROCEDURE — 36415 COLL VENOUS BLD VENIPUNCTURE: CPT

## 2021-05-18 PROCEDURE — 80048 BASIC METABOLIC PNL TOTAL CA: CPT

## 2021-05-18 PROCEDURE — 93010 ELECTROCARDIOGRAM REPORT: CPT | Performed by: INTERNAL MEDICINE

## 2021-05-18 PROCEDURE — 85025 COMPLETE CBC W/AUTO DIFF WBC: CPT

## 2021-05-18 PROCEDURE — U0004 COV-19 TEST NON-CDC HGH THRU: HCPCS | Performed by: NURSE PRACTITIONER

## 2021-05-18 PROCEDURE — 93005 ELECTROCARDIOGRAM TRACING: CPT

## 2021-05-18 PROCEDURE — C9803 HOPD COVID-19 SPEC COLLECT: HCPCS | Performed by: NURSE PRACTITIONER

## 2021-05-20 ENCOUNTER — ANESTHESIA (OUTPATIENT)
Dept: PERIOP | Facility: HOSPITAL | Age: 41
End: 2021-05-20

## 2021-05-20 ENCOUNTER — HOSPITAL ENCOUNTER (OUTPATIENT)
Dept: MAMMOGRAPHY | Facility: HOSPITAL | Age: 41
Discharge: HOME | End: 2021-05-20

## 2021-05-20 ENCOUNTER — APPOINTMENT (OUTPATIENT)
Dept: GENERAL RADIOLOGY | Facility: HOSPITAL | Age: 41
End: 2021-05-20

## 2021-05-20 ENCOUNTER — ANESTHESIA EVENT (OUTPATIENT)
Dept: PERIOP | Facility: HOSPITAL | Age: 41
End: 2021-05-20

## 2021-05-20 ENCOUNTER — HOSPITAL ENCOUNTER (OUTPATIENT)
Facility: HOSPITAL | Age: 41
Setting detail: HOSPITAL OUTPATIENT SURGERY
Discharge: HOME | End: 2021-05-20
Attending: STUDENT IN AN ORGANIZED HEALTH CARE EDUCATION/TRAINING PROGRAM | Admitting: STUDENT IN AN ORGANIZED HEALTH CARE EDUCATION/TRAINING PROGRAM

## 2021-05-20 VITALS
HEART RATE: 67 BPM | WEIGHT: 211.86 LBS | RESPIRATION RATE: 16 BRPM | BODY MASS INDEX: 32.21 KG/M2 | DIASTOLIC BLOOD PRESSURE: 77 MMHG | SYSTOLIC BLOOD PRESSURE: 114 MMHG | TEMPERATURE: 97.4 F | OXYGEN SATURATION: 99 %

## 2021-05-20 DIAGNOSIS — N64.9 LESION OF BREAST: ICD-10-CM

## 2021-05-20 DIAGNOSIS — N64.9 BREAST LESION: Primary | ICD-10-CM

## 2021-05-20 PROCEDURE — 25010000002 MIDAZOLAM PER 1 MG: Performed by: STUDENT IN AN ORGANIZED HEALTH CARE EDUCATION/TRAINING PROGRAM

## 2021-05-20 PROCEDURE — 25010000002 PROPOFOL 10 MG/ML EMULSION: Performed by: NURSE ANESTHETIST, CERTIFIED REGISTERED

## 2021-05-20 PROCEDURE — 25010000002 HYDROMORPHONE PER 4 MG: Performed by: NURSE ANESTHETIST, CERTIFIED REGISTERED

## 2021-05-20 PROCEDURE — 25010000002 KETOROLAC TROMETHAMINE PER 15 MG: Performed by: NURSE ANESTHETIST, CERTIFIED REGISTERED

## 2021-05-20 PROCEDURE — 25010000003 CEFAZOLIN IN DEXTROSE 2-4 GM/100ML-% SOLUTION: Performed by: STUDENT IN AN ORGANIZED HEALTH CARE EDUCATION/TRAINING PROGRAM

## 2021-05-20 PROCEDURE — 88307 TISSUE EXAM BY PATHOLOGIST: CPT | Performed by: STUDENT IN AN ORGANIZED HEALTH CARE EDUCATION/TRAINING PROGRAM

## 2021-05-20 PROCEDURE — C1889 IMPLANT/INSERT DEVICE, NOC: HCPCS | Performed by: STUDENT IN AN ORGANIZED HEALTH CARE EDUCATION/TRAINING PROGRAM

## 2021-05-20 PROCEDURE — C1819 TISSUE LOCALIZATION-EXCISION: HCPCS

## 2021-05-20 PROCEDURE — 25010000003 LIDOCAINE 1 % SOLUTION: Performed by: STUDENT IN AN ORGANIZED HEALTH CARE EDUCATION/TRAINING PROGRAM

## 2021-05-20 PROCEDURE — 19125 EXCISION BREAST LESION: CPT | Performed by: STUDENT IN AN ORGANIZED HEALTH CARE EDUCATION/TRAINING PROGRAM

## 2021-05-20 PROCEDURE — 76098 X-RAY EXAM SURGICAL SPECIMEN: CPT

## 2021-05-20 PROCEDURE — 25010000002 FENTANYL CITRATE (PF) 50 MCG/ML SOLUTION: Performed by: NURSE ANESTHETIST, CERTIFIED REGISTERED

## 2021-05-20 PROCEDURE — 25010000002 ONDANSETRON PER 1 MG: Performed by: NURSE ANESTHETIST, CERTIFIED REGISTERED

## 2021-05-20 PROCEDURE — 25010000002 DEXAMETHASONE PER 1 MG: Performed by: NURSE ANESTHETIST, CERTIFIED REGISTERED

## 2021-05-20 DEVICE — CLIP LIGAT VASC HORIZON TI MD BLU 6CT: Type: IMPLANTABLE DEVICE | Site: BREAST | Status: FUNCTIONAL

## 2021-05-20 RX ORDER — SODIUM CHLORIDE 0.9 % (FLUSH) 0.9 %
3-10 SYRINGE (ML) INJECTION AS NEEDED
Status: DISCONTINUED | OUTPATIENT
Start: 2021-05-20 | End: 2021-05-20 | Stop reason: HOSPADM

## 2021-05-20 RX ORDER — DIAZEPAM 5 MG/1
5 TABLET ORAL ONCE
Status: COMPLETED | OUTPATIENT
Start: 2021-05-20 | End: 2021-05-20

## 2021-05-20 RX ORDER — SODIUM CHLORIDE 0.9 % (FLUSH) 0.9 %
3 SYRINGE (ML) INJECTION EVERY 12 HOURS SCHEDULED
Status: DISCONTINUED | OUTPATIENT
Start: 2021-05-20 | End: 2021-05-20 | Stop reason: HOSPADM

## 2021-05-20 RX ORDER — FLUMAZENIL 0.1 MG/ML
0.2 INJECTION INTRAVENOUS AS NEEDED
Status: DISCONTINUED | OUTPATIENT
Start: 2021-05-20 | End: 2021-05-20 | Stop reason: HOSPADM

## 2021-05-20 RX ORDER — CEFAZOLIN SODIUM 2 G/100ML
2 INJECTION, SOLUTION INTRAVENOUS ONCE
Status: COMPLETED | OUTPATIENT
Start: 2021-05-20 | End: 2021-05-20

## 2021-05-20 RX ORDER — ACETAMINOPHEN 500 MG
500 TABLET ORAL ONCE
Status: COMPLETED | OUTPATIENT
Start: 2021-05-20 | End: 2021-05-20

## 2021-05-20 RX ORDER — MIDAZOLAM HYDROCHLORIDE 1 MG/ML
1 INJECTION INTRAMUSCULAR; INTRAVENOUS
Status: DISCONTINUED | OUTPATIENT
Start: 2021-05-20 | End: 2021-05-20 | Stop reason: HOSPADM

## 2021-05-20 RX ORDER — HYDROCODONE BITARTRATE AND ACETAMINOPHEN 7.5; 325 MG/1; MG/1
1 TABLET ORAL ONCE AS NEEDED
Status: COMPLETED | OUTPATIENT
Start: 2021-05-20 | End: 2021-05-20

## 2021-05-20 RX ORDER — PROMETHAZINE HYDROCHLORIDE 25 MG/1
25 TABLET ORAL ONCE AS NEEDED
Status: DISCONTINUED | OUTPATIENT
Start: 2021-05-20 | End: 2021-05-20 | Stop reason: HOSPADM

## 2021-05-20 RX ORDER — DIPHENHYDRAMINE HCL 25 MG
25 CAPSULE ORAL
Status: DISCONTINUED | OUTPATIENT
Start: 2021-05-20 | End: 2021-05-20 | Stop reason: HOSPADM

## 2021-05-20 RX ORDER — DIPHENHYDRAMINE HYDROCHLORIDE 50 MG/ML
12.5 INJECTION INTRAMUSCULAR; INTRAVENOUS
Status: DISCONTINUED | OUTPATIENT
Start: 2021-05-20 | End: 2021-05-20 | Stop reason: HOSPADM

## 2021-05-20 RX ORDER — PROPOFOL 10 MG/ML
VIAL (ML) INTRAVENOUS AS NEEDED
Status: DISCONTINUED | OUTPATIENT
Start: 2021-05-20 | End: 2021-05-20 | Stop reason: SURG

## 2021-05-20 RX ORDER — SODIUM CHLORIDE, SODIUM LACTATE, POTASSIUM CHLORIDE, CALCIUM CHLORIDE 600; 310; 30; 20 MG/100ML; MG/100ML; MG/100ML; MG/100ML
9 INJECTION, SOLUTION INTRAVENOUS CONTINUOUS
Status: DISCONTINUED | OUTPATIENT
Start: 2021-05-20 | End: 2021-05-20 | Stop reason: HOSPADM

## 2021-05-20 RX ORDER — MAGNESIUM HYDROXIDE 1200 MG/15ML
LIQUID ORAL AS NEEDED
Status: DISCONTINUED | OUTPATIENT
Start: 2021-05-20 | End: 2021-05-20 | Stop reason: HOSPADM

## 2021-05-20 RX ORDER — PROMETHAZINE HYDROCHLORIDE 25 MG/1
25 SUPPOSITORY RECTAL ONCE AS NEEDED
Status: DISCONTINUED | OUTPATIENT
Start: 2021-05-20 | End: 2021-05-20 | Stop reason: HOSPADM

## 2021-05-20 RX ORDER — TRISODIUM CITRATE DIHYDRATE AND CITRIC ACID MONOHYDRATE 500; 334 MG/5ML; MG/5ML
30 SOLUTION ORAL ONCE
Status: COMPLETED | OUTPATIENT
Start: 2021-05-20 | End: 2021-05-20

## 2021-05-20 RX ORDER — HYDROMORPHONE HYDROCHLORIDE 1 MG/ML
0.5 INJECTION, SOLUTION INTRAMUSCULAR; INTRAVENOUS; SUBCUTANEOUS
Status: DISCONTINUED | OUTPATIENT
Start: 2021-05-20 | End: 2021-05-20 | Stop reason: HOSPADM

## 2021-05-20 RX ORDER — KETOROLAC TROMETHAMINE 30 MG/ML
INJECTION, SOLUTION INTRAMUSCULAR; INTRAVENOUS AS NEEDED
Status: DISCONTINUED | OUTPATIENT
Start: 2021-05-20 | End: 2021-05-20 | Stop reason: SURG

## 2021-05-20 RX ORDER — DEXAMETHASONE SODIUM PHOSPHATE 10 MG/ML
INJECTION INTRAMUSCULAR; INTRAVENOUS AS NEEDED
Status: DISCONTINUED | OUTPATIENT
Start: 2021-05-20 | End: 2021-05-20 | Stop reason: SURG

## 2021-05-20 RX ORDER — OXYCODONE AND ACETAMINOPHEN 7.5; 325 MG/1; MG/1
1 TABLET ORAL ONCE AS NEEDED
Status: DISCONTINUED | OUTPATIENT
Start: 2021-05-20 | End: 2021-05-20 | Stop reason: HOSPADM

## 2021-05-20 RX ORDER — LABETALOL HYDROCHLORIDE 5 MG/ML
5 INJECTION, SOLUTION INTRAVENOUS
Status: DISCONTINUED | OUTPATIENT
Start: 2021-05-20 | End: 2021-05-20 | Stop reason: HOSPADM

## 2021-05-20 RX ORDER — NALOXONE HCL 0.4 MG/ML
0.2 VIAL (ML) INJECTION AS NEEDED
Status: DISCONTINUED | OUTPATIENT
Start: 2021-05-20 | End: 2021-05-20 | Stop reason: HOSPADM

## 2021-05-20 RX ORDER — FENTANYL CITRATE 50 UG/ML
INJECTION, SOLUTION INTRAMUSCULAR; INTRAVENOUS AS NEEDED
Status: DISCONTINUED | OUTPATIENT
Start: 2021-05-20 | End: 2021-05-20 | Stop reason: SURG

## 2021-05-20 RX ORDER — LIDOCAINE HYDROCHLORIDE 20 MG/ML
INJECTION, SOLUTION INFILTRATION; PERINEURAL AS NEEDED
Status: DISCONTINUED | OUTPATIENT
Start: 2021-05-20 | End: 2021-05-20 | Stop reason: SURG

## 2021-05-20 RX ORDER — LIDOCAINE HYDROCHLORIDE 10 MG/ML
0.5 INJECTION, SOLUTION EPIDURAL; INFILTRATION; INTRACAUDAL; PERINEURAL ONCE AS NEEDED
Status: DISCONTINUED | OUTPATIENT
Start: 2021-05-20 | End: 2021-05-20 | Stop reason: HOSPADM

## 2021-05-20 RX ORDER — BUPIVACAINE HYDROCHLORIDE AND EPINEPHRINE 5; 5 MG/ML; UG/ML
INJECTION, SOLUTION PERINEURAL AS NEEDED
Status: DISCONTINUED | OUTPATIENT
Start: 2021-05-20 | End: 2021-05-20 | Stop reason: HOSPADM

## 2021-05-20 RX ORDER — EPHEDRINE SULFATE 50 MG/ML
5 INJECTION, SOLUTION INTRAVENOUS ONCE AS NEEDED
Status: DISCONTINUED | OUTPATIENT
Start: 2021-05-20 | End: 2021-05-20 | Stop reason: HOSPADM

## 2021-05-20 RX ORDER — FENTANYL CITRATE 50 UG/ML
50 INJECTION, SOLUTION INTRAMUSCULAR; INTRAVENOUS
Status: DISCONTINUED | OUTPATIENT
Start: 2021-05-20 | End: 2021-05-20 | Stop reason: HOSPADM

## 2021-05-20 RX ORDER — HYDROMORPHONE HCL 110MG/55ML
PATIENT CONTROLLED ANALGESIA SYRINGE INTRAVENOUS AS NEEDED
Status: DISCONTINUED | OUTPATIENT
Start: 2021-05-20 | End: 2021-05-20 | Stop reason: SURG

## 2021-05-20 RX ORDER — LIDOCAINE HYDROCHLORIDE 10 MG/ML
3 INJECTION, SOLUTION INFILTRATION; PERINEURAL ONCE
Status: COMPLETED | OUTPATIENT
Start: 2021-05-20 | End: 2021-05-20

## 2021-05-20 RX ORDER — ONDANSETRON 2 MG/ML
4 INJECTION INTRAMUSCULAR; INTRAVENOUS ONCE AS NEEDED
Status: COMPLETED | OUTPATIENT
Start: 2021-05-20 | End: 2021-05-20

## 2021-05-20 RX ORDER — TRAMADOL HYDROCHLORIDE 50 MG/1
50 TABLET ORAL EVERY 6 HOURS PRN
Qty: 10 TABLET | Refills: 0 | Status: SHIPPED | OUTPATIENT
Start: 2021-05-20 | End: 2021-06-01

## 2021-05-20 RX ADMIN — CEFAZOLIN SODIUM 2 G: 2 INJECTION, SOLUTION INTRAVENOUS at 11:53

## 2021-05-20 RX ADMIN — HYDROCODONE BITARTRATE AND ACETAMINOPHEN 1 TABLET: 7.5; 325 TABLET ORAL at 13:51

## 2021-05-20 RX ADMIN — PROPOFOL 250 MG: 10 INJECTION, EMULSION INTRAVENOUS at 11:53

## 2021-05-20 RX ADMIN — MIDAZOLAM 1 MG: 1 INJECTION INTRAMUSCULAR; INTRAVENOUS at 11:17

## 2021-05-20 RX ADMIN — LIDOCAINE HYDROCHLORIDE 3 ML: 10 INJECTION, SOLUTION INFILTRATION; PERINEURAL at 10:55

## 2021-05-20 RX ADMIN — LIDOCAINE HYDROCHLORIDE 40 MG: 20 INJECTION, SOLUTION INFILTRATION; PERINEURAL at 11:53

## 2021-05-20 RX ADMIN — ONDANSETRON 4 MG: 2 INJECTION INTRAMUSCULAR; INTRAVENOUS at 12:01

## 2021-05-20 RX ADMIN — PROPOFOL 100 MG: 10 INJECTION, EMULSION INTRAVENOUS at 12:42

## 2021-05-20 RX ADMIN — PROPOFOL 100 MG: 10 INJECTION, EMULSION INTRAVENOUS at 12:23

## 2021-05-20 RX ADMIN — PROPOFOL 100 MG: 10 INJECTION, EMULSION INTRAVENOUS at 12:33

## 2021-05-20 RX ADMIN — FENTANYL CITRATE 100 MCG: 50 INJECTION INTRAMUSCULAR; INTRAVENOUS at 11:51

## 2021-05-20 RX ADMIN — HYDROMORPHONE HYDROCHLORIDE 0.5 MG: 2 INJECTION, SOLUTION INTRAMUSCULAR; INTRAVENOUS; SUBCUTANEOUS at 12:42

## 2021-05-20 RX ADMIN — HYDROMORPHONE HYDROCHLORIDE 1 MG: 2 INJECTION, SOLUTION INTRAMUSCULAR; INTRAVENOUS; SUBCUTANEOUS at 12:33

## 2021-05-20 RX ADMIN — FENTANYL CITRATE 100 MCG: 50 INJECTION INTRAMUSCULAR; INTRAVENOUS at 12:21

## 2021-05-20 RX ADMIN — KETOROLAC TROMETHAMINE 30 MG: 30 INJECTION, SOLUTION INTRAMUSCULAR at 12:01

## 2021-05-20 RX ADMIN — SODIUM CITRATE AND CITRIC ACID MONOHYDRATE 30 ML: 500; 334 SOLUTION ORAL at 14:09

## 2021-05-20 RX ADMIN — ACETAMINOPHEN 500 MG: 500 TABLET, FILM COATED ORAL at 09:24

## 2021-05-20 RX ADMIN — SODIUM CHLORIDE, POTASSIUM CHLORIDE, SODIUM LACTATE AND CALCIUM CHLORIDE 9 ML/HR: 600; 310; 30; 20 INJECTION, SOLUTION INTRAVENOUS at 11:13

## 2021-05-20 RX ADMIN — PROPOFOL 30 MG: 10 INJECTION, EMULSION INTRAVENOUS at 12:09

## 2021-05-20 RX ADMIN — DIAZEPAM 5 MG: 5 TABLET ORAL at 09:24

## 2021-05-20 RX ADMIN — DEXAMETHASONE SODIUM PHOSPHATE 4 MG: 10 INJECTION INTRAMUSCULAR; INTRAVENOUS at 12:01

## 2021-05-20 RX ADMIN — FENTANYL CITRATE 50 MCG: 50 INJECTION, SOLUTION INTRAMUSCULAR; INTRAVENOUS at 13:20

## 2021-05-20 NOTE — ANESTHESIA POSTPROCEDURE EVALUATION
Patient: Vanessa Ward    Procedure Summary     Date: 05/20/21 Room / Location:  [facility]  /  [facility]    Anesthesia Start: 1150 Anesthesia Stop: 1304    Procedure: Left breast needle-localized excisional biopsy (Left Breast) Diagnosis:       Breast lesion      (Breast lesion [N64.9])    Surgeons: Nilsa Joe MD Provider: Nicolas Lopez MD    Anesthesia Type: general ASA Status: 2          Anesthesia Type: general    Vitals  Vitals Value Taken Time   /79 05/20/21 1415   Temp 36.3 °C (97.4 °F) 05/20/21 1415   Pulse 63 05/20/21 1417   Resp 14 05/20/21 1415   SpO2 98 % 05/20/21 1417   Vitals shown include unvalidated device data.        Post Anesthesia Care and Evaluation    Patient location during evaluation: bedside  Patient participation: complete - patient participated  Level of consciousness: awake and alert  Pain management: adequate  Airway patency: patent  Anesthetic complications: No anesthetic complications    Cardiovascular status: acceptable  Respiratory status: acceptable  Hydration status: acceptable    Comments: /79 (BP Location: Right arm, Patient Position: Lying)   Pulse 62   Temp 36.3 °C (97.4 °F) (Temporal)   Resp 14   Wt 96.1 kg (211 lb 13.8 oz)   SpO2 98%   BMI 32.21 kg/m²

## 2021-05-20 NOTE — ANESTHESIA PROCEDURE NOTES
Airway  Urgency: elective    Date/Time: 5/20/2021 11:55 AM  Airway not difficult    General Information and Staff    Patient location during procedure: OR  Anesthesiologist: Nicolas Lopez MD  CRNA: Emerald Viramontes CRNA    Indications and Patient Condition  Indications for airway management: airway protection    Preoxygenated: yes  Mask difficulty assessment: 1 - vent by mask    Final Airway Details  Final airway type: supraglottic airway      Successful airway: unique  Size 4    Number of attempts at approach: 1  Assessment: lips, teeth, and gum same as pre-op    Additional Comments  Smooth IV/mask induction and placement of LMA. Atraumatic, lips/teeth/mouth intact, as preop. +ETCO2, bilateral breath sounds and equal.

## 2021-05-20 NOTE — ANESTHESIA PREPROCEDURE EVALUATION
Anesthesia Evaluation     Patient summary reviewed and Nursing notes reviewed   no history of anesthetic complications:  NPO Solid Status: > 8 hours  NPO Liquid Status: > 2 hours           Airway   Mallampati: II  TM distance: >3 FB  Neck ROM: full  No difficulty expected  Dental - normal exam     Pulmonary     breath sounds clear to auscultation  Cardiovascular   Exercise tolerance: good (4-7 METS)    Rhythm: regular  Rate: normal        Neuro/Psych  (+) psychiatric history Anxiety,     GI/Hepatic/Renal/Endo    (+) obesity,  GERD,      Musculoskeletal     Abdominal    Substance History      OB/GYN          Other   autoimmune disease Sjogren syndrome,                      Anesthesia Plan    ASA 2     general     intravenous induction     Anesthetic plan, all risks, benefits, and alternatives have been provided, discussed and informed consent has been obtained with: patient.

## 2021-05-22 ENCOUNTER — TELEPHONE (OUTPATIENT)
Dept: SURGERY | Facility: CLINIC | Age: 41
End: 2021-05-22

## 2021-05-22 LAB
LAB AP CASE REPORT: NORMAL
LAB AP DIAGNOSIS COMMENT: NORMAL
PATH REPORT.FINAL DX SPEC: NORMAL
PATH REPORT.GROSS SPEC: NORMAL

## 2021-05-22 NOTE — TELEPHONE ENCOUNTER
Called patient regarding recent pathology reports. No evidence of malignancy. Follow up in two weeks.     Final Diagnosis   1.  Breast, Left, Lumpectomy (42 grams):                A.  Clip (cork shaped) and biopsy site changes noted.               B.  Negative for atypia, in situ and infiltrating carcinoma.                C.  Background breast parenchyma shows fibroadenomatoid change, clustered apocrine cysts,         stromal fibrosis, and microcalcifications identified in association with benign lobular structures.      Nilsa Joe MD

## 2021-06-01 ENCOUNTER — OFFICE VISIT (OUTPATIENT)
Dept: SURGERY | Facility: CLINIC | Age: 41
End: 2021-06-01

## 2021-06-01 DIAGNOSIS — Z98.890 HISTORY OF BREAST LUMP/MASS EXCISION: Primary | ICD-10-CM

## 2021-06-01 PROCEDURE — 99024 POSTOP FOLLOW-UP VISIT: CPT | Performed by: STUDENT IN AN ORGANIZED HEALTH CARE EDUCATION/TRAINING PROGRAM

## 2021-06-01 RX ORDER — GABAPENTIN 100 MG/1
100 CAPSULE ORAL 3 TIMES DAILY
COMMUNITY
Start: 2021-05-28

## 2021-06-08 ENCOUNTER — TRANSCRIBE ORDERS (OUTPATIENT)
Dept: ADMINISTRATIVE | Facility: HOSPITAL | Age: 41
End: 2021-06-08

## 2021-06-08 DIAGNOSIS — Z12.31 SCREENING MAMMOGRAM, ENCOUNTER FOR: Primary | ICD-10-CM

## 2021-11-19 ENCOUNTER — APPOINTMENT (OUTPATIENT)
Dept: MAMMOGRAPHY | Facility: HOSPITAL | Age: 41
End: 2021-11-19

## 2021-11-23 ENCOUNTER — OFFICE VISIT (OUTPATIENT)
Dept: SURGERY | Facility: CLINIC | Age: 41
End: 2021-11-23

## 2021-11-23 VITALS — HEIGHT: 68 IN | WEIGHT: 228.6 LBS | BODY MASS INDEX: 34.65 KG/M2

## 2021-11-23 DIAGNOSIS — N64.4 BREAST PAIN, LEFT: Primary | ICD-10-CM

## 2021-11-23 PROCEDURE — 99212 OFFICE O/P EST SF 10 MIN: CPT | Performed by: STUDENT IN AN ORGANIZED HEALTH CARE EDUCATION/TRAINING PROGRAM

## 2021-11-23 RX ORDER — FOLIC ACID 1 MG/1
1 TABLET ORAL DAILY
COMMUNITY
Start: 2021-09-14

## 2021-11-23 RX ORDER — DIAZEPAM 10 MG/1
1 TABLET ORAL EVERY 12 HOURS
COMMUNITY
Start: 2021-08-11

## 2021-11-23 RX ORDER — PAROXETINE HYDROCHLORIDE 20 MG/1
1 TABLET, FILM COATED ORAL DAILY
COMMUNITY
Start: 2021-08-11

## 2021-11-23 NOTE — PROGRESS NOTES
General Surgery History and Physical Exam     Summary:    41 y.o. lady presents for persistent left breast pain. She had an excisional biopsy of mucocele-like lesion 5/20/2021, final pathology benign. There are no concerning findings on examination today.  Advised to decrease caffeine intake, be professionally fitted for a supportive bra, and take oral vitamin E and/or can apply vitamin E cream to incision site.  We will continue with scheduled mammogram in January 2022. She can follow-up in our office after imaging and to assess her response to the above interventions.     Referring Provider: No ref. provider found    Chief Complaint: left breast pain    History of Present Illness: Ms. Vanessa Ward is a 40 year old lady, seen at the request of No ref. provider found for a new diagnosis of left breast abnormal biopsy. This was initially detected as an imaging abnormality.  This was her first mammogram. She denies any prior history of abnormal mammograms or breast biopsies.  Her work-up is detailed in the oncologic history below. She denies any breast lumps, pain, skin changes, or nipple discharge.  She has always had a left inverted nipple. She denies any family history of breast or ovarian cancer.     6/2/2021 doing well.  No acute issues postoperatively.  She does complain of some soreness.  She is no longer taking any pain medicine.  No bruising or swelling.  She is resuming normal activities.    11/23/2021 Patient presents today for persistent left breast pain.  She states this has been occurring since her excisional biopsy in 05/2021.  She describes the pain as sharp at times and other times is dull and aching. She states near the incision site she has numbness and tingling. She has tried a compression bra and has not seen any improvement in her symptoms. She is scheduled for her routine mammogram screening on 1/19/2022. She reports 1 cup of coffee per day.    Workup of Current Diagnosis:    4/2/2020  Bilateral digital screening mammogram:  There are a few coarse heterogeneous calcifications with grouped distribution seen in the middle one third 130 o'clock region of the left breast.  Impression: Calcifications in the left breast require additional evaluation.  Magnification mammography is recommended.  BI-RADS Category 0: Incomplete: Needs additional imaging evaluation    4/26/2021 left breast diagnostic mammogram with [facility]:  On the present exam, there are multiple amorphous and coarse heterogeneous calcifications measuring 4 mm with grouped distribution in the posterior one third 130 o'clock region of the left breast located 12 cm from the nipple.  Additional imaging demonstrates microcalcifications are suspicious and tissue sampling is recommended.  Impression: Calcifications in the left breast are suspicious.  Stereotactic biopsy is recommended.  BI-RADS Category 4A: Suspicious abnormality    5/4/2021 pathology:  Breast, left 130 o'clock, core needle biopsy: Acellular stromal mucin with features suggestive of mucocele-like lesion.  Microcalcifications associated with acellular mucin and benign mammary ducts/lobules.  Although the overall findings favor a mucocele-like lesion, a mucinous carcinoma cannot be entirely excluded on core needle biopsy.  If clinically indicated, conservative excision may be appropriate.    5/20/2021 left breast wire-localized excisional biopsy   Final Diagnosis   1.  Breast, Left, Lumpectomy (42 grams):                A.  Clip (cork shaped) and biopsy site changes noted.               B.  Negative for atypia, in situ and infiltrating carcinoma.                C.  Background breast parenchyma shows fibroadenomatoid change, clustered apocrine cysts,         stromal fibrosis, and microcalcifications identified in association with benign lobular structures.     5/20/2021 left breast wire localized excisional biopsy  Final Diagnosis   1.  Breast, Left, Lumpectomy (42 grams):                 A.  Clip (cork shaped) and biopsy site changes noted.               B.  Negative for atypia, in situ and infiltrating carcinoma.                C.  Background breast parenchyma shows fibroadenomatoid change, clustered apocrine cysts,         stromal fibrosis, and microcalcifications identified in association with benign lobular structures.      Gynecologic History:   . P: 2. AB: 0.  Age at menarche: 13  Age at first childbirth: 23  Lactation/How lon  Age at menopause: N/A  Total years of oral contraceptive use: 0  Total years of hormone replacement therapy: 0    Past Medical History:   • Sjogren's syndrome  • Fibromyalgia    Past Surgical History:    • Hysterectomy in     Family History:    • No family history of breast or ovarian cancer    Social History:   • Patient drinks 1-2 servings of caffeine per day.  • Denies tobacco use  • Occasional alcohol use    Allergies:   No Known Allergies    Medications:   • None    Laboratory Values:    White blood cell count 3.8 hemoglobin 13.7 platelets 177 creatinine 0.7    Review of Systems:   Influenza-like illness: no fever, no  cough, no  sore throat, no  body aches, no loss of sense of taste or smell, no known exposure to person with Covid-19.  Constitutional: Negative for fevers or chills  HENT: Negative for hearing loss or runny nose  Eyes: Negative for vision changes or scleral icterus  Respiratory: Negative for cough or shortness of breath  Cardiovascular: Negative for chest pain or heart palpitations  Gastrointestinal: Negative for abdominal pain, nausea, vomiting, constipation, melena, or hematochezia  Genitourinary: Negative for hematuria or dysuria  Musculoskeletal: Negative for joint swelling or gait instability  Neurologic: Negative for tremors or seizures  Psychiatric: Negative for suicidal ideations or depression  Positive for left breast pain.  All other systems reviewed and negative    Physical Exam:   • ECO - Asymptomatic  • Constitutional:  Well-developed well-nourished, no acute distress  • Eyes: Conjunctiva normal, sclera nonicteric  • ENMT: Hearing grossly normal, oral mucosa moist  • Neck: Supple, no palpable mass, trachea midline  • Respiratory: Clear to auscultation, normal inspiratory effort  • Cardiovascular: Regular rate, no murmur, no peripheral edema, no jugular venous distention  • Breast: symmetric  o Right: No visible abnormalities on inspection while seated, with arms raised or hands on hips. No masses, skin changes, or nipple abnormalities.  o Left: No visible abnormalities on inspection while seated, with arms raised or hands on hips. No masses, skin changes, or nipple abnormalities.  Postoperative incision from 1 to 3 o'clock position in the left lateral breast, healed.  • Gastrointestinal: Soft, nontender  • Lymphatics (palpable nodes): No cervical, supraclavicular or axillary lymphadenopathy  • Skin:  Warm, dry, no rash on visualized skin surfaces  • Musculoskeletal: Symmetric strength, normal gait  • Psychiatric: Alert and oriented ×3, normal affect     Molly Morales PA-C  General Surgery  [facility]    [address], [address]  [place], [place], [address]  P: [phone]  F: [phone]

## 2022-01-19 ENCOUNTER — HOSPITAL ENCOUNTER (OUTPATIENT)
Dept: MAMMOGRAPHY | Facility: HOSPITAL | Age: 42
Discharge: HOME | End: 2022-01-19
Admitting: STUDENT IN AN ORGANIZED HEALTH CARE EDUCATION/TRAINING PROGRAM

## 2022-01-19 DIAGNOSIS — Z12.31 SCREENING MAMMOGRAM, ENCOUNTER FOR: ICD-10-CM

## 2022-01-19 PROCEDURE — 77063 BREAST TOMOSYNTHESIS BI: CPT

## 2022-01-19 PROCEDURE — 77067 SCR MAMMO BI INCL CAD: CPT

## 2022-01-20 ENCOUNTER — TELEPHONE (OUTPATIENT)
Dept: SURGERY | Facility: CLINIC | Age: 42
End: 2022-01-20

## 2022-01-20 NOTE — TELEPHONE ENCOUNTER
HUB TO SHARE:    Called and left vm for the patient informing her that her mammo on 1/19/22 was normal with no abnormalities. If she is still having issues with breast pain, she can follow up with Molly Morales PA-C this spring 6 months from her last appointment on 11/23/21 which would be at the end of April 2022. Recommended that she call our office back at any time if she would like to schedule with Molly.

## 2022-01-20 NOTE — TELEPHONE ENCOUNTER
----- Message from Nilsa Joe MD sent at 1/20/2022 11:14 AM EST -----  Regarding: Mammo results  Can you left her know that her mammogram was normal with no abnormalities? If she is still having issues with breast pain, she can follow up with Molly Morales this spring 6 months from her last appointment, which would be 5/2022 I believe.     Thanks,   Nilsa

## 2023-07-05 ENCOUNTER — APPOINTMENT (OUTPATIENT)
Dept: WOMENS IMAGING | Facility: HOSPITAL | Age: 43
End: 2023-07-05
Payer: COMMERCIAL

## 2023-07-05 PROCEDURE — A4648 IMPLANTABLE TISSUE MARKER: HCPCS | Performed by: RADIOLOGY

## 2023-07-05 PROCEDURE — 19081 BX BREAST 1ST LESION STRTCTC: CPT | Performed by: RADIOLOGY

## (undated) DEVICE — TRAP FLD MINIVAC MEGADYNE 100ML

## (undated) DEVICE — NDL HYPO PRECISIONGLIDE REG 25G 1 1/2

## (undated) DEVICE — SUT MNCRYL PLS ANTIB UD 4/0 PS2 18IN

## (undated) DEVICE — GLV SURG BIOGEL LTX PF 6 1/2

## (undated) DEVICE — SUT SILK 2/0 FS BLK 18IN 685G

## (undated) DEVICE — PK PROC MINOR TOWER 40

## (undated) DEVICE — ANTIBACTERIAL UNDYED BRAIDED (POLYGLACTIN 910), SYNTHETIC ABSORBABLE SUTURE: Brand: COATED VICRYL

## (undated) DEVICE — PENCL E/S ULTRAVAC TELESCP NOSE HOLSTR 10FT

## (undated) DEVICE — ADHS SKIN SURG TISS VISC PREMIERPRO EXOFIN HI/VISC FAST/DRY

## (undated) DEVICE — PATIENT RETURN ELECTRODE, SINGLE-USE, CONTACT QUALITY MONITORING, ADULT, WITH 9FT CORD, FOR PATIENTS WEIGING OVER 33LBS. (15KG): Brand: MEGADYNE

## (undated) DEVICE — GLV SURG SENSICARE PI PF LF 7 GRN STRL